# Patient Record
Sex: MALE | Race: OTHER | URBAN - METROPOLITAN AREA
[De-identification: names, ages, dates, MRNs, and addresses within clinical notes are randomized per-mention and may not be internally consistent; named-entity substitution may affect disease eponyms.]

---

## 2017-05-02 NOTE — PATIENT DISCUSSION
MODERATE DRY EYES: PRESCRIBED DISAPPEARING PRESERVATIVE OR PRESERVATIVE FREE ARTIFICIAL TEARS BID &ndash; QID4-6X A DAY, OU AND THE DAILY INTAKE OF OMEGA-3 DHA/EPA FATTY ACIDS TO HELP RELIEVE SYMPTOMS. ADD NIGHTLY LUBRICATING OINTMENT OR GEL. DISCUSSED INSERTING PUNCTAL PLUGS TODAY. PATIENT WISHES TO HAVE PLUGS PLACED TODAY. IF THIS DOESN'T HELP CAN TRY RESTASIS.

## 2017-08-18 NOTE — PATIENT DISCUSSION
New Prescription: Maxitrol (neomycin-polymyxin-dexameth): ointment: 3.5-10,000-0.1 mg-unit/g-% 1 a small amount at bedtime as directed into affected eye 08-

## 2018-06-11 NOTE — PATIENT DISCUSSION
Continue: Maxitrol (neomycin-polymyxin-dexameth): ointment: 3.5-10,000-0.1 mg-unit/g-% 1 a small amount at bedtime as directed into affected eye 08-

## 2018-07-23 NOTE — PATIENT DISCUSSION
CHECKED GLASSES RX AND RX IS CORRECT. REDID MR AND PT 20/20 IN PHOROPTER WITH GLASSES RX. HAD OPTICAL CHECK PD. PD OFF IN GLASSES. PT WISHES TO HAVE GLASSES REMADE.  PT INSTRUCTED TO RETURN WITH ANY FURTHER ISSUES OR CONCERNS

## 2022-10-18 ENCOUNTER — NEW PATIENT (OUTPATIENT)
Dept: URBAN - METROPOLITAN AREA CLINIC 48 | Facility: LOCATION | Age: 72
End: 2022-10-18

## 2022-10-18 DIAGNOSIS — H26.493: ICD-10-CM

## 2022-10-18 DIAGNOSIS — H40.013: ICD-10-CM

## 2022-10-18 DIAGNOSIS — R42: ICD-10-CM

## 2022-10-18 DIAGNOSIS — H35.373: ICD-10-CM

## 2022-10-18 DIAGNOSIS — H53.8: ICD-10-CM

## 2022-10-18 DIAGNOSIS — H55.00: ICD-10-CM

## 2022-10-18 DIAGNOSIS — H04.123: ICD-10-CM

## 2022-10-18 PROCEDURE — 92004 COMPRE OPH EXAM NEW PT 1/>: CPT

## 2022-10-18 PROCEDURE — 92134 CPTRZ OPH DX IMG PST SGM RTA: CPT

## 2022-10-18 ASSESSMENT — VISUAL ACUITY
OS_GLARE: 20/50
OS_GLARE: 20/30-1
OD_GLARE: 20/60-1
OS_SC: 20/50-2
OD_SC: 20/30-2

## 2022-10-18 ASSESSMENT — KERATOMETRY
OS_AXISANGLE2_DEGREES: 85
OS_K1POWER_DIOPTERS: 42.00
OD_AXISANGLE_DEGREES: 105
OS_K2POWER_DIOPTERS: 43.00
OD_AXISANGLE2_DEGREES: 15
OD_K1POWER_DIOPTERS: 42.50
OD_K2POWER_DIOPTERS: 42.75
OS_AXISANGLE_DEGREES: 175

## 2022-10-18 ASSESSMENT — TONOMETRY
OD_IOP_MMHG: 14
OS_IOP_MMHG: 14